# Patient Record
Sex: MALE | Race: WHITE | URBAN - METROPOLITAN AREA
[De-identification: names, ages, dates, MRNs, and addresses within clinical notes are randomized per-mention and may not be internally consistent; named-entity substitution may affect disease eponyms.]

---

## 2021-06-04 ENCOUNTER — TELEPHONE (OUTPATIENT)
Dept: OTHER | Age: 36
End: 2021-06-04

## 2021-06-04 NOTE — TELEPHONE ENCOUNTER
RN=please call patient on Monday 6/7/21 to re assess his diarrhea,see my note below.            Reason for Call/Symptoms: diarrhea  Onset: x 1 day   Courtesy Assessment: stomach cramps before the diarrhea ,episode 5 times yesterday and more than 5 times tod

## 2021-06-07 NOTE — TELEPHONE ENCOUNTER
Patient states his diarrhea has cleared up and that it feels like his anal fissure \"is better,\" and not sure if he should keep his appointment for tomorrow with Dr. Mike Lester. Advised to keep appointment to make sure his fissure is indeed healing.

## 2021-06-08 ENCOUNTER — TELEPHONE (OUTPATIENT)
Dept: FAMILY MEDICINE CLINIC | Facility: CLINIC | Age: 36
End: 2021-06-08

## 2021-06-08 NOTE — TELEPHONE ENCOUNTER
Spoke with pt, pain and symptoms are improving. No more rectal bleeding . Advised hot baths and gentle wiping.  To come see me only if needed in future

## 2021-07-14 ENCOUNTER — PATIENT MESSAGE (OUTPATIENT)
Dept: FAMILY MEDICINE CLINIC | Facility: CLINIC | Age: 36
End: 2021-07-14

## 2021-07-14 ENCOUNTER — OFFICE VISIT (OUTPATIENT)
Dept: FAMILY MEDICINE CLINIC | Facility: CLINIC | Age: 36
End: 2021-07-14
Payer: COMMERCIAL

## 2021-07-14 VITALS
BODY MASS INDEX: 20.59 KG/M2 | SYSTOLIC BLOOD PRESSURE: 122 MMHG | DIASTOLIC BLOOD PRESSURE: 77 MMHG | HEART RATE: 60 BPM | HEIGHT: 71.65 IN | RESPIRATION RATE: 16 BRPM | TEMPERATURE: 97 F | WEIGHT: 150.38 LBS

## 2021-07-14 DIAGNOSIS — K60.2 ANAL FISSURE: ICD-10-CM

## 2021-07-14 DIAGNOSIS — Z00.00 ROUTINE PHYSICAL EXAMINATION: Primary | ICD-10-CM

## 2021-07-14 PROCEDURE — 3078F DIAST BP <80 MM HG: CPT | Performed by: FAMILY MEDICINE

## 2021-07-14 PROCEDURE — 99385 PREV VISIT NEW AGE 18-39: CPT | Performed by: FAMILY MEDICINE

## 2021-07-14 PROCEDURE — 3008F BODY MASS INDEX DOCD: CPT | Performed by: FAMILY MEDICINE

## 2021-07-14 PROCEDURE — 3074F SYST BP LT 130 MM HG: CPT | Performed by: FAMILY MEDICINE

## 2021-07-14 RX ORDER — DOCUSATE SODIUM 100 MG/1
100 CAPSULE, LIQUID FILLED ORAL 2 TIMES DAILY
COMMUNITY

## 2021-07-14 RX ORDER — GARLIC EXTRACT 500 MG
1 CAPSULE ORAL DAILY
COMMUNITY

## 2021-07-14 RX ORDER — HYDROCORTISONE 25 MG/G
1 CREAM TOPICAL 2 TIMES DAILY
Qty: 28 G | Refills: 0 | Status: SHIPPED | OUTPATIENT
Start: 2021-07-14 | End: 2021-07-28

## 2021-07-14 RX ORDER — MULTIVIT-MIN/IRON/FOLIC ACID/K 18-600-40
CAPSULE ORAL
COMMUNITY

## 2021-07-14 NOTE — PROGRESS NOTES
HPI:  31 yr old male who presents for physical. Just moved from [place]. . No children. Works as a . Works at home. Exercises 4 days per week. Healthy diet. Had kidney stone in 2016. Passed on it's own.      Had colonoscopy in 2007 clear.  Gary TMs intact with good landmarks noted. Neck supple. Good ROM. No LAD.   Thyroid normal.  CV:  Regular rate and rhythm; no murmurs  Lungs:  Clear to ausculation; good aeration               No wheezes, rales or rhonchi  Abd: soft, non-tender, n

## 2021-08-03 ENCOUNTER — TELEPHONE (OUTPATIENT)
Dept: FAMILY MEDICINE CLINIC | Facility: CLINIC | Age: 36
End: 2021-08-03

## 2021-08-04 ENCOUNTER — OFFICE VISIT (OUTPATIENT)
Dept: SURGERY | Facility: CLINIC | Age: 36
End: 2021-08-04
Payer: COMMERCIAL

## 2021-08-04 ENCOUNTER — NURSE ONLY (OUTPATIENT)
Dept: FAMILY MEDICINE CLINIC | Facility: CLINIC | Age: 36
End: 2021-08-04
Payer: COMMERCIAL

## 2021-08-04 VITALS
DIASTOLIC BLOOD PRESSURE: 78 MMHG | WEIGHT: 153 LBS | SYSTOLIC BLOOD PRESSURE: 118 MMHG | HEIGHT: 71 IN | BODY MASS INDEX: 21.42 KG/M2

## 2021-08-04 DIAGNOSIS — Z23 NEED FOR VACCINATION: Primary | ICD-10-CM

## 2021-08-04 DIAGNOSIS — K60.1 CHRONIC ANAL FISSURE: Primary | ICD-10-CM

## 2021-08-04 DIAGNOSIS — K92.1 HEMATOCHEZIA: ICD-10-CM

## 2021-08-04 PROCEDURE — 90471 IMMUNIZATION ADMIN: CPT | Performed by: FAMILY MEDICINE

## 2021-08-04 PROCEDURE — 3008F BODY MASS INDEX DOCD: CPT | Performed by: SURGERY

## 2021-08-04 PROCEDURE — 3074F SYST BP LT 130 MM HG: CPT | Performed by: SURGERY

## 2021-08-04 PROCEDURE — 99204 OFFICE O/P NEW MOD 45 MIN: CPT | Performed by: SURGERY

## 2021-08-04 PROCEDURE — 3078F DIAST BP <80 MM HG: CPT | Performed by: SURGERY

## 2021-08-04 PROCEDURE — 90715 TDAP VACCINE 7 YRS/> IM: CPT | Performed by: FAMILY MEDICINE

## 2021-08-04 PROCEDURE — 46600 DIAGNOSTIC ANOSCOPY SPX: CPT | Performed by: SURGERY

## 2021-08-04 NOTE — TELEPHONE ENCOUNTER
Spoke with the patient and he scheduled an appt for today at 4:00 with the nurse for his Tdap vaccine.

## 2021-08-04 NOTE — H&P
History and Physical      HPI   Patient presents with:  Referral: Referral from Dr. Yusuf Patiño possible anal fissure. Onset about 4 months. Patient reports 1 to 2 BM's mostly every morning. Patient notices blood after wiping.   Patient had colonoscopy in tobacco: Never Used    Substance and Sexual Activity      Alcohol use:  Yes        Alcohol/week: 4.0 standard drinks        Types: 4 Glasses of wine per week      Drug use: Yes        Types: Cannabis        Comment: rarely    Family History   Problem Relati of lifestyle and IBS. Advised pt that he can follow up in office if he develops new/concerning symptoms. All of the patient's questions have been answered to his satisfaction.        8/4/2021  Alex Luke MD

## 2021-08-04 NOTE — PROGRESS NOTES
Patient is here today for his Tdap injection. Patient verified date of birth as well as first and last name. Patient tolerated vaccine well with no adverse reaction noted at this time.

## 2021-08-04 NOTE — PATIENT INSTRUCTIONS
Start an U-Jonathan suppository twice a day.     Follow-up with Isabella Gregorio from gastroenterology

## 2021-08-27 ENCOUNTER — TELEPHONE (OUTPATIENT)
Dept: SURGERY | Facility: CLINIC | Age: 36
End: 2021-08-27

## 2021-08-27 NOTE — TELEPHONE ENCOUNTER
Per pt after a month he continues to have pain and bleeding, requesting to speak to RN. Also  pt called pharmacy and suppository prescription has not been received. Please advise thank you.

## 2021-09-21 ENCOUNTER — TELEPHONE (OUTPATIENT)
Dept: GASTROENTEROLOGY | Facility: CLINIC | Age: 36
End: 2021-09-21

## 2021-09-21 NOTE — TELEPHONE ENCOUNTER
TERESE and Tressa message sent to pt confirming appt is at 230pm not 3pm (notes in message state coming at 3pm)

## 2021-09-21 NOTE — H&P
[facility] - Gastroenterology                                                                                                               Reason for consult: ibs    Requesting physician or provider: ROMA Stoner kg)  07/14/21 : 150 lb 6.4 oz (68.2 kg)       History, Medications, Allergies, ROS:      Past Medical History:   Diagnosis Date   • Anal fissure       History reviewed. No pertinent surgical history.    Family Hx:   Family History   Problem Relation Age of 3.2 oz (70.9 kg).     GEN: WD/WN, NAD  HEENT: Supple symmetrical, trachea midline  CV: RRR, the extremities are warm and well perfused   LUNGS: No increased work of breathing  ABDOMEN: No scars, normal bowel sounds, soft, non-tender, non-distended no reboun substitute Trilyte (or any similar generic prep). This can be done by notifying the pharmacy or calling our office.       Orders This Visit:  Orders Placed This Encounter      CBC W Differential W Platelet      Comp Metabolic Panel (14)      TSH W Reflex To

## 2021-09-23 ENCOUNTER — OFFICE VISIT (OUTPATIENT)
Dept: GASTROENTEROLOGY | Facility: CLINIC | Age: 36
End: 2021-09-23
Payer: COMMERCIAL

## 2021-09-23 ENCOUNTER — PATIENT MESSAGE (OUTPATIENT)
Dept: GASTROENTEROLOGY | Facility: CLINIC | Age: 36
End: 2021-09-23

## 2021-09-23 ENCOUNTER — TELEPHONE (OUTPATIENT)
Dept: GASTROENTEROLOGY | Facility: CLINIC | Age: 36
End: 2021-09-23

## 2021-09-23 ENCOUNTER — LAB ENCOUNTER (OUTPATIENT)
Dept: LAB | Age: 36
End: 2021-09-23
Attending: FAMILY MEDICINE
Payer: COMMERCIAL

## 2021-09-23 VITALS
BODY MASS INDEX: 21.87 KG/M2 | WEIGHT: 156.19 LBS | DIASTOLIC BLOOD PRESSURE: 69 MMHG | TEMPERATURE: 99 F | SYSTOLIC BLOOD PRESSURE: 115 MMHG | HEART RATE: 61 BPM | HEIGHT: 71 IN

## 2021-09-23 DIAGNOSIS — K58.9 IRRITABLE BOWEL SYNDROME WITHOUT DIARRHEA: ICD-10-CM

## 2021-09-23 DIAGNOSIS — K60.2 ANAL FISSURE: Primary | ICD-10-CM

## 2021-09-23 DIAGNOSIS — K60.2 ANAL FISSURE: ICD-10-CM

## 2021-09-23 DIAGNOSIS — K58.9 IRRITABLE BOWEL SYNDROME, UNSPECIFIED TYPE: ICD-10-CM

## 2021-09-23 DIAGNOSIS — K62.5 BRBPR (BRIGHT RED BLOOD PER RECTUM): ICD-10-CM

## 2021-09-23 DIAGNOSIS — K62.5 BRIGHT RED BLOOD PER RECTUM: ICD-10-CM

## 2021-09-23 LAB
ALBUMIN SERPL-MCNC: 3.9 G/DL (ref 3.4–5)
ALBUMIN/GLOB SERPL: 1 {RATIO} (ref 1–2)
ALP LIVER SERPL-CCNC: 85 U/L
ALT SERPL-CCNC: 24 U/L
ANION GAP SERPL CALC-SCNC: 5 MMOL/L (ref 0–18)
AST SERPL-CCNC: 15 U/L (ref 15–37)
BASOPHILS # BLD AUTO: 0.06 X10(3) UL (ref 0–0.2)
BASOPHILS NFR BLD AUTO: 0.7 %
BILIRUB SERPL-MCNC: 0.3 MG/DL (ref 0.1–2)
BUN BLD-MCNC: 20 MG/DL (ref 7–18)
BUN/CREAT SERPL: 27 (ref 10–20)
CALCIUM BLD-MCNC: 8.7 MG/DL (ref 8.5–10.1)
CHLORIDE SERPL-SCNC: 103 MMOL/L (ref 98–112)
CO2 SERPL-SCNC: 31 MMOL/L (ref 21–32)
CREAT BLD-MCNC: 0.74 MG/DL
CRP SERPL-MCNC: <0.29 MG/DL (ref ?–0.3)
DEPRECATED RDW RBC AUTO: 43.4 FL (ref 35.1–46.3)
EOSINOPHIL # BLD AUTO: 0.27 X10(3) UL (ref 0–0.7)
EOSINOPHIL NFR BLD AUTO: 3.2 %
ERYTHROCYTE [DISTWIDTH] IN BLOOD BY AUTOMATED COUNT: 12.4 % (ref 11–15)
ERYTHROCYTE [SEDIMENTATION RATE] IN BLOOD: 10 MM/HR
GLOBULIN PLAS-MCNC: 3.9 G/DL (ref 2.8–4.4)
GLUCOSE BLD-MCNC: 91 MG/DL (ref 70–99)
HCT VFR BLD AUTO: 47.7 %
HGB BLD-MCNC: 15.7 G/DL
IMM GRANULOCYTES # BLD AUTO: 0.03 X10(3) UL (ref 0–1)
IMM GRANULOCYTES NFR BLD: 0.4 %
LYMPHOCYTES # BLD AUTO: 2.21 X10(3) UL (ref 1–4)
LYMPHOCYTES NFR BLD AUTO: 26.3 %
MCH RBC QN AUTO: 30.8 PG (ref 26–34)
MCHC RBC AUTO-ENTMCNC: 32.9 G/DL (ref 31–37)
MCV RBC AUTO: 93.7 FL
MONOCYTES # BLD AUTO: 0.79 X10(3) UL (ref 0.1–1)
MONOCYTES NFR BLD AUTO: 9.4 %
NEUTROPHILS # BLD AUTO: 5.05 X10 (3) UL (ref 1.5–7.7)
NEUTROPHILS # BLD AUTO: 5.05 X10(3) UL (ref 1.5–7.7)
NEUTROPHILS NFR BLD AUTO: 60 %
OSMOLALITY SERPL CALC.SUM OF ELEC: 290 MOSM/KG (ref 275–295)
PATIENT FASTING Y/N/NP: NO
PLATELET # BLD AUTO: 289 10(3)UL (ref 150–450)
POTASSIUM SERPL-SCNC: 4.5 MMOL/L (ref 3.5–5.1)
PROT SERPL-MCNC: 7.8 G/DL (ref 6.4–8.2)
RBC # BLD AUTO: 5.09 X10(6)UL
SODIUM SERPL-SCNC: 139 MMOL/L (ref 136–145)
TSI SER-ACNC: 1.14 MIU/ML (ref 0.36–3.74)
VIT B12 SERPL-MCNC: 613 PG/ML (ref 193–986)
VIT D+METAB SERPL-MCNC: 47.7 NG/ML (ref 30–100)
WBC # BLD AUTO: 8.4 X10(3) UL (ref 4–11)

## 2021-09-23 PROCEDURE — 85025 COMPLETE CBC W/AUTO DIFF WBC: CPT

## 2021-09-23 PROCEDURE — 85652 RBC SED RATE AUTOMATED: CPT

## 2021-09-23 PROCEDURE — 82607 VITAMIN B-12: CPT

## 2021-09-23 PROCEDURE — 84443 ASSAY THYROID STIM HORMONE: CPT

## 2021-09-23 PROCEDURE — 82306 VITAMIN D 25 HYDROXY: CPT | Performed by: NURSE PRACTITIONER

## 2021-09-23 PROCEDURE — 3074F SYST BP LT 130 MM HG: CPT | Performed by: NURSE PRACTITIONER

## 2021-09-23 PROCEDURE — 3008F BODY MASS INDEX DOCD: CPT | Performed by: NURSE PRACTITIONER

## 2021-09-23 PROCEDURE — 99244 OFF/OP CNSLTJ NEW/EST MOD 40: CPT | Performed by: NURSE PRACTITIONER

## 2021-09-23 PROCEDURE — 36415 COLL VENOUS BLD VENIPUNCTURE: CPT

## 2021-09-23 PROCEDURE — 86140 C-REACTIVE PROTEIN: CPT

## 2021-09-23 PROCEDURE — 80053 COMPREHEN METABOLIC PANEL: CPT

## 2021-09-23 PROCEDURE — 3078F DIAST BP <80 MM HG: CPT | Performed by: NURSE PRACTITIONER

## 2021-09-23 NOTE — TELEPHONE ENCOUNTER
From: Ana Mauro  To: [facility].  Nivia Barthel  Sent: 9/23/2021 4:13 PM CDT  Subject: Probiotics    Hello,    What are the names of the probiotic supplements you recommend?    -Jose Ruff

## 2021-09-23 NOTE — TELEPHONE ENCOUNTER
Scheduled for:  Colonoscopy 86064  Provider Name:  Dr Makayla Masters  Date:  11/15/21  Location:  [facility]  Sedation:  [facility]  Time:  1:15pm (pt is aware that [facility] will call the day before to confirm arrival time)   Prep:  Trilyte  Meds/Allergies Reconciled?:  Adele/MOJGAN

## 2021-09-23 NOTE — PATIENT INSTRUCTIONS
-ibgard  -citrucel  -labs  1. Schedule colonoscopy with [facility] w/ Dr. Royal Olguin [Diagnosis: anal fissure, brbpr, ibs]    2.  bowel prep from pharmacy (split trilyte)    3. Continue all medications for procedure    4.  Read all bowel prep instructions care

## 2021-10-04 RX ORDER — POLYETHYLENE GLYCOL 3350, SODIUM CHLORIDE, SODIUM BICARBONATE, POTASSIUM CHLORIDE 420; 11.2; 5.72; 1.48 G/4L; G/4L; G/4L; G/4L
4000 POWDER, FOR SOLUTION ORAL ONCE
Qty: 1 EACH | Refills: 0 | Status: SHIPPED | OUTPATIENT
Start: 2021-10-04 | End: 2021-10-04

## 2021-10-04 NOTE — TELEPHONE ENCOUNTER
Pt called in to get refill on medication for the upcoming procedure, he is unsure which medication is being prescribed to him.  Please follow up

## 2021-11-02 ENCOUNTER — TELEPHONE (OUTPATIENT)
Dept: GASTROENTEROLOGY | Facility: CLINIC | Age: 36
End: 2021-11-02

## 2021-11-03 NOTE — TELEPHONE ENCOUNTER
Cancelled for:  Colonoscopy 66660  Provider Name:  Dr Vicky Espinal  Date:  11/15/21  Location:  [facility]  Sedation:  [facility]  Time:  4441    Prep:  Yemi Yanez  Meds/Allergies Reconciled?:  Adele/NP reviewed.    Diagnosis with codes:  Anal Fissure K60.2 Bright red blood pe

## 2024-08-14 ENCOUNTER — OFFICE VISIT (OUTPATIENT)
Dept: FAMILY MEDICINE CLINIC | Facility: CLINIC | Age: 39
End: 2024-08-14
Payer: COMMERCIAL

## 2024-08-14 ENCOUNTER — LAB ENCOUNTER (OUTPATIENT)
Dept: LAB | Age: 39
End: 2024-08-14
Payer: COMMERCIAL

## 2024-08-14 ENCOUNTER — PATIENT MESSAGE (OUTPATIENT)
Dept: FAMILY MEDICINE CLINIC | Facility: CLINIC | Age: 39
End: 2024-08-14

## 2024-08-14 VITALS
HEART RATE: 84 BPM | SYSTOLIC BLOOD PRESSURE: 124 MMHG | BODY MASS INDEX: 19.43 KG/M2 | HEIGHT: 72.5 IN | OXYGEN SATURATION: 97 % | DIASTOLIC BLOOD PRESSURE: 70 MMHG | WEIGHT: 145 LBS | TEMPERATURE: 97 F

## 2024-08-14 DIAGNOSIS — E55.9 VITAMIN D DEFICIENCY: ICD-10-CM

## 2024-08-14 DIAGNOSIS — K52.9 CHRONIC DIARRHEA: ICD-10-CM

## 2024-08-14 DIAGNOSIS — M54.50 CHRONIC BILATERAL LOW BACK PAIN WITHOUT SCIATICA: ICD-10-CM

## 2024-08-14 DIAGNOSIS — R19.7 DIARRHEA, UNSPECIFIED TYPE: Primary | ICD-10-CM

## 2024-08-14 DIAGNOSIS — Z00.00 ENCOUNTER FOR ANNUAL PHYSICAL EXAM: ICD-10-CM

## 2024-08-14 DIAGNOSIS — G89.29 CHRONIC BILATERAL LOW BACK PAIN WITHOUT SCIATICA: ICD-10-CM

## 2024-08-14 DIAGNOSIS — Z00.00 ENCOUNTER FOR ANNUAL PHYSICAL EXAM: Primary | ICD-10-CM

## 2024-08-14 LAB
ALBUMIN SERPL-MCNC: 4.3 G/DL (ref 3.2–4.8)
ALBUMIN/GLOB SERPL: 1.5 {RATIO} (ref 1–2)
ALP LIVER SERPL-CCNC: 63 U/L
ALT SERPL-CCNC: 12 U/L
ANION GAP SERPL CALC-SCNC: 5 MMOL/L (ref 0–18)
AST SERPL-CCNC: 16 U/L (ref ?–34)
BILIRUB SERPL-MCNC: 1 MG/DL (ref 0.3–1.2)
BUN BLD-MCNC: 9 MG/DL (ref 9–23)
BUN/CREAT SERPL: 10.1 (ref 10–20)
CALCIUM BLD-MCNC: 9.7 MG/DL (ref 8.7–10.4)
CHLORIDE SERPL-SCNC: 104 MMOL/L (ref 98–112)
CHOLEST SERPL-MCNC: 166 MG/DL (ref ?–200)
CO2 SERPL-SCNC: 32 MMOL/L (ref 21–32)
CREAT BLD-MCNC: 0.89 MG/DL
EGFRCR SERPLBLD CKD-EPI 2021: 113 ML/MIN/1.73M2 (ref 60–?)
FASTING PATIENT LIPID ANSWER: NO
FASTING STATUS PATIENT QL REPORTED: NO
GLOBULIN PLAS-MCNC: 2.8 G/DL (ref 2–3.5)
GLUCOSE BLD-MCNC: 91 MG/DL (ref 70–99)
HDLC SERPL-MCNC: 65 MG/DL (ref 40–59)
LDLC SERPL CALC-MCNC: 87 MG/DL (ref ?–100)
NONHDLC SERPL-MCNC: 101 MG/DL (ref ?–130)
OSMOLALITY SERPL CALC.SUM OF ELEC: 290 MOSM/KG (ref 275–295)
POTASSIUM SERPL-SCNC: 4.5 MMOL/L (ref 3.5–5.1)
PROT SERPL-MCNC: 7.1 G/DL (ref 5.7–8.2)
SODIUM SERPL-SCNC: 141 MMOL/L (ref 136–145)
TRIGL SERPL-MCNC: 75 MG/DL (ref 30–149)
TSI SER-ACNC: 1.88 MIU/ML (ref 0.55–4.78)
VIT D+METAB SERPL-MCNC: 29.8 NG/ML (ref 30–100)
VLDLC SERPL CALC-MCNC: 12 MG/DL (ref 0–30)

## 2024-08-14 PROCEDURE — 3074F SYST BP LT 130 MM HG: CPT

## 2024-08-14 PROCEDURE — 80061 LIPID PANEL: CPT

## 2024-08-14 PROCEDURE — 82306 VITAMIN D 25 HYDROXY: CPT

## 2024-08-14 PROCEDURE — 3078F DIAST BP <80 MM HG: CPT

## 2024-08-14 PROCEDURE — 99395 PREV VISIT EST AGE 18-39: CPT

## 2024-08-14 PROCEDURE — 80053 COMPREHEN METABOLIC PANEL: CPT

## 2024-08-14 PROCEDURE — 84443 ASSAY THYROID STIM HORMONE: CPT

## 2024-08-14 PROCEDURE — 36415 COLL VENOUS BLD VENIPUNCTURE: CPT

## 2024-08-14 PROCEDURE — 3008F BODY MASS INDEX DOCD: CPT

## 2024-08-14 RX ORDER — ERGOCALCIFEROL 1.25 MG/1
50000 CAPSULE ORAL WEEKLY
Qty: 12 CAPSULE | Refills: 0 | Status: SHIPPED | OUTPATIENT
Start: 2024-08-14

## 2024-08-14 RX ORDER — CHOLECALCIFEROL (VITAMIN D3) 10(400)/ML
DROPS ORAL
COMMUNITY
Start: 2023-08-07

## 2024-08-14 NOTE — PROGRESS NOTES
Devon Sandoval is a 39 year old male.No chief complaint on file.    HPI:   Devon Sandoval presented to the clinic for annual physical examination. No acute concerns. No changes in family/personal history. Normal Sleep. Normal appetite. Balanced diet. Normal BM/Urination. Physically active, swims.  HX of chronic diarrhea, has seen GI in the past.   Occupation: writer    Current Outpatient Medications   Medication Sig Dispense Refill    Loperamide HCl (IMODIUM A-D OR)       Digestive Enzyme Oral Cap       METHYLCELLULOSE, LAXATIVE, OR Take by mouth.      Cholecalciferol (VITAMIN D) 50 MCG (2000 UT) Oral Cap Take by mouth.      propranolol 10 MG Oral Tab Take 1 tablet (10 mg total) by mouth as needed. 30 tablet 1    Psyllium (METAMUCIL FIBER OR)       Hydrocortisone (ANUCORT-HC) 25 MG Rectal Suppos Place 1 suppository (25 mg total) rectally 2 (two) times daily. 20 suppository 1    Acidophilus/Pectin Oral Cap Take 1 capsule by mouth daily. (Patient not taking: Reported on 8/14/2024)        Past Medical History:    Anal fissure      No past surgical history on file.   Social History:  Social History     Socioeconomic History    Marital status:    Tobacco Use    Smoking status: Never    Smokeless tobacco: Never   Substance and Sexual Activity    Alcohol use: Yes     Alcohol/week: 4.0 standard drinks of alcohol     Types: 4 Glasses of wine per week    Drug use: Yes     Types: Cannabis     Comment: rarely      Family History   Problem Relation Age of Onset    Cancer Father         bladder    Stroke Mother         TIA    Cancer Paternal Grandmother         lung- smoker      Allergies   Allergen Reactions    Sulfa Antibiotics HIVES        REVIEW OF SYSTEMS:   Review of Systems   Constitutional:  Negative for activity change.   HENT: Negative.     Eyes: Negative.    Respiratory:  Negative for chest tightness and shortness of breath.    Cardiovascular:  Negative for chest pain and palpitations.   Gastrointestinal:  Negative  for abdominal pain, constipation and diarrhea.   Genitourinary: Negative.  Negative for difficulty urinating.   Musculoskeletal:  Positive for back pain (intermittent).   Skin: Negative.    Neurological: Negative.  Negative for dizziness and headaches.   Psychiatric/Behavioral: Negative.     All other systems reviewed and are negative.     Wt Readings from Last 5 Encounters:   08/14/24 145 lb (65.8 kg)   09/23/21 156 lb 3.2 oz (70.9 kg)   08/04/21 153 lb (69.4 kg)   07/14/21 150 lb 6.4 oz (68.2 kg)     Body mass index is 19.4 kg/m².      EXAM:   /70 (BP Location: Right arm, Patient Position: Sitting, Cuff Size: adult)   Pulse 84   Temp 97.4 °F (36.3 °C) (Temporal)   Ht 6' 0.5\" (1.842 m)   Wt 145 lb (65.8 kg)   SpO2 97%   BMI 19.40 kg/m²   Physical Exam  Vitals and nursing note reviewed.   Constitutional:       Appearance: Normal appearance.   HENT:      Head: Normocephalic and atraumatic.      Right Ear: Tympanic membrane and external ear normal.      Left Ear: Tympanic membrane and external ear normal.      Mouth/Throat:      Mouth: Mucous membranes are moist.      Pharynx: Oropharynx is clear.   Eyes:      Conjunctiva/sclera: Conjunctivae normal.      Pupils: Pupils are equal, round, and reactive to light.   Cardiovascular:      Rate and Rhythm: Normal rate and regular rhythm.      Pulses: Normal pulses.      Heart sounds: Normal heart sounds.   Pulmonary:      Effort: Pulmonary effort is normal.      Breath sounds: No wheezing.   Abdominal:      General: Abdomen is flat. There is no distension.      Palpations: Abdomen is soft. There is no mass.      Tenderness: There is no abdominal tenderness. There is no guarding.      Hernia: No hernia is present.   Musculoskeletal:         General: Normal range of motion.      Cervical back: Normal and normal range of motion.      Thoracic back: Normal.      Lumbar back: Normal. No spasms or tenderness. Normal range of motion. Negative right straight leg raise  test and negative left straight leg raise test.   Skin:     General: Skin is warm.   Neurological:      General: No focal deficit present.      Mental Status: He is alert and oriented to person, place, and time.   Psychiatric:         Mood and Affect: Mood normal.         Behavior: Behavior normal.            ASSESSMENT AND PLAN:   (Z00.00) Encounter for annual physical exam  (primary encounter diagnosis)  Plan: Lipid Panel [E], TSH W Reflex To Free T4 [E],         Comp Metabolic Panel (14) [E]        - Immunizations UTD   - tobacco, alcohol, illicit drug use discouraged  - safe sexual practices advised  - Reinforced healthy diet, lifestyle, and exercise.  - Past Medical/Social/Family histories reviewed  - Regular dental visits recommended   - Regular eye exams recommended     No recommendations at this time  No recommendations at this time   No recommendations at this time   No recommendations at this time      Follow up in 1 year or sooner if needed      (E55.9) Vitamin D deficiency  Plan: Vitamin D [E]        Labs completed today.  Further management pending results.    (K52.9) Chronic diarrhea  Plan: Patient taking Imodium for chronic diarrhea.  Has followed up with GI in the past-IBS.  Labs completed today.  Further management pending results.    (M54.50,  G89.29) Chronic bilateral low back pain without sciatica  Plan: Patient with chronic intermittent low back pain.  Denies radiation to the lower extremities.  Denies decreased range of motion.  Unable to provoke pain today.  Negative exam.  Has completed PT with success.  Advised to follow-up if symptoms return or worsen.  Consider imaging at that time.    Follow-up as needed.      The patient indicates understanding of these issues and agrees to the plan.  Chaperone offered to the patient prior to examination    This note was prepared using Dragon Medical voice recognition dictation software. As a result errors may occur. When identified these errors have  been corrected. While every attempt is made to correct errors during dictation discrepancies may still exist.

## 2024-08-15 NOTE — TELEPHONE ENCOUNTER
From: Devon Sandoval  To: Natasha Zarate  Sent: 8/14/2024 5:04 PM CDT  Subject: Prescription    You mentioned sending a prescription to my pharmacy. Is that just for Vitamin D, or did you also approve of me taking Lomotil based on my blood work?

## 2024-08-19 RX ORDER — DIPHENOXYLATE HYDROCHLORIDE AND ATROPINE SULFATE 2.5; .025 MG/1; MG/1
1 TABLET ORAL 2 TIMES DAILY PRN
Qty: 30 TABLET | Refills: 0 | Status: SHIPPED | OUTPATIENT
Start: 2024-08-19

## 2024-08-19 NOTE — TELEPHONE ENCOUNTER
Short term only script only. Will need to follow up with GI to discuss long term if there is improvement in chronic diarrhea. Referral placed.

## 2024-11-13 ENCOUNTER — OFFICE VISIT (OUTPATIENT)
Dept: GASTROENTEROLOGY | Facility: CLINIC | Age: 39
End: 2024-11-13
Payer: COMMERCIAL

## 2024-11-13 VITALS
HEIGHT: 77 IN | SYSTOLIC BLOOD PRESSURE: 113 MMHG | WEIGHT: 148 LBS | BODY MASS INDEX: 17.47 KG/M2 | HEART RATE: 55 BPM | DIASTOLIC BLOOD PRESSURE: 67 MMHG

## 2024-11-13 DIAGNOSIS — K62.5 BRBPR (BRIGHT RED BLOOD PER RECTUM): ICD-10-CM

## 2024-11-13 DIAGNOSIS — R14.0 GASSINESS: Primary | ICD-10-CM

## 2024-11-13 PROCEDURE — 99213 OFFICE O/P EST LOW 20 MIN: CPT | Performed by: NURSE PRACTITIONER

## 2024-11-13 RX ORDER — TURMERIC 400 MG
CAPSULE ORAL
COMMUNITY
Start: 2024-10-01

## 2024-11-13 NOTE — PROGRESS NOTES
[facility] - Gastroenterology                                                                                                               Reason for consult: eval    Requesting physician or provider: Colleen Weiler, DO    Chief Complaint   Patient presents with    Bloating       HPI:   Devon Sandoval is a 39 year old year-old male with history of anal fissure:     He is here today for f/U    Last seen 2021  ibs  Sx since 2014    H/o bloody diarrhea 2006 and had cln at that time in [place].  Reportedly normal  Saw gastro in [place] 2017 for same symptoms-went once and was given fodmap diet  Tried it and didn't help  Didn't f/U    H/o 4 anal fissures and seeing Dr. Guevara  At last visit no fissure on exam and started on steroid suppository     Reports trying imodium, but had side effects and made fissures worse so stopped.      Has used probiotic, citrucel/metamucil  Currently using citrucel.  He reports having excessive flatulence.    No bloating    Is following FODMAP diet    he moves his bowels daily. He denies diarrhea as long as he avoid triggers.   he denies brbpr and/or melena.    he denies acid reflux and/or heartburn. he denies dysphagia, odynophagia and/or globus. he denies abdominal pain. he denies nausea and/or vomiting.  he denies recent change in appetite and/or unintentional weight loss.    NSAIDS: no  Tobacco: no  Alcohol: 2-3 drinks/week  Illicit drugs: marijuana rare     No FH GI malignancy, ibd, celiac    No history of adverse reaction to sedation  No MARTIR  No anticoagulants  No pacemaker/defibrillator  No pain medications and/or sleep aides      Last colonoscopy: 2007 in [place] and he reports was normal   Last EGD: no    Wt Readings from Last 6 Encounters:   11/13/24 148 lb (67.1 kg)   08/14/24 145 lb (65.8 kg)   09/23/21 156 lb 3.2 oz (70.9 kg)   08/04/21 153 lb (69.4 kg)   07/14/21 150 lb 6.4 oz (68.2 kg)        History, Medications, Allergies, ROS:      Past  Medical History:    Anal fissure      Past Surgical History:   Procedure Laterality Date    Colonoscopy  2007    [place]      Family Hx:   Family History   Problem Relation Age of Onset    Cancer Father         bladder    Stroke Mother         TIA    Cancer Paternal Grandmother         lung- smoker      Social History:   Social History     Socioeconomic History    Marital status:    Tobacco Use    Smoking status: Never    Smokeless tobacco: Never   Substance and Sexual Activity    Alcohol use: Yes     Alcohol/week: 4.0 standard drinks of alcohol     Types: 4 Glasses of wine per week    Drug use: Yes     Types: Cannabis     Comment: rarely        Medications (Active prior to today's visit):  Current Outpatient Medications   Medication Sig Dispense Refill    Turmeric 400 MG Oral Cap       LACTOBACILLUS BIFIDUS OR       rifAXIMin 550 MG Oral Tab Take 1 tablet (550 mg total) by mouth 3 (three) times daily for 14 days. 42 tablet 0    Digestive Enzyme Oral Cap       METHYLCELLULOSE, LAXATIVE, OR Take by mouth.      Cholecalciferol (VITAMIN D) 50 MCG (2000 UT) Oral Cap Take by mouth.      diphenoxylate-atropine 2.5-0.025 MG Oral Tab Take 1 tablet by mouth 2 (two) times daily as needed for Diarrhea. 30 tablet 0    ergocalciferol 1.25 MG (11826 UT) Oral Cap Take 1 capsule (50,000 Units total) by mouth once a week. 12 capsule 0       Allergies:  Allergies[1]    ROS:   CONSTITUTIONAL: negative for fevers, chills, sweats and weight loss  EYES Negative for red eyes, yellow eyes, changes in vision  HEENT: Negative for dysphagia and hoarseness  RESPIRATORY: Negative for cough and shortness of breath  CARDIOVASCULAR: Negative for chest pain, palpitations  GASTROINTESTINAL: See HPI  GENITOURINARY: Negative for dysuria and frequency  MUSCULOSKELETAL: Negative for arthralgias and myalgias  NEUROLOGICAL: Negative for dizziness and headaches  BEHAVIOR/PSYCH: Negative for anxiety and poor appetite    PHYSICAL EXAM:   Blood  pressure 113/67, pulse 55, height 6' 5\" (1.956 m), weight 148 lb (67.1 kg).    GEN: WD/WN, NAD  HEENT: Supple symmetrical, trachea midline  CV: RRR, the extremities are warm and well perfused   LUNGS: No increased work of breathing  ABDOMEN: No scars, normal bowel sounds, soft, non-tender, non-distended no rebound or guarding, no masses, no hepatomegaly  MSK: No redness, no warmth, no swelling of joints  SKIN: No jaundice, no erythema, no rashes  HEMATOLOGIC: No bleeding, no bruising  NEURO: Alert and interactive, normal gait    Labs/Imaging/Procedures:     Patient's pertinent labs and imaging were reviewed and discussed with patient today.        .  ASSESSMENT/PLAN:   Devon Sandoval is a 39 year old year-old male with history of anal fissure:     #brbpr  Did not schedule c-scope after last visit. Diarrhea, brbpr resolved. Not anemic on recent cbc. Recommend CTM for need for c-scope.    #gassiness  He says symptoms controlled with Fodmap diet.  He denies brbpr, melena. N/v, abd pain.  He describes on-going gassiness with trigger foods. We discussed considering testing for sibo vai breath test vs empiric treatment.  He is electing empiric treatment. Recommendations below.     -Xifaxan  -align following antibiotic treatment  -continue fodmap diet and cycle food back in every 3 mos  -consider seeing Joel Siu at [facility] (functional specialist)      Orders This Visit:  No orders of the defined types were placed in this encounter.      Meds This Visit:  Requested Prescriptions     Signed Prescriptions Disp Refills    rifAXIMin 550 MG Oral Tab 42 tablet 0     Sig: Take 1 tablet (550 mg total) by mouth 3 (three) times daily for 14 days.       Imaging & Referrals:  None      Adele Torres, APRN   11/13/2024        This note was partially prepared using Dragon Medical voice recognition dictation software. As a result, errors may occur. When identified, these errors have been corrected. While every attempt is made to  correct errors during dictation, discrepancies may still exist.          [1]   Allergies  Allergen Reactions    Sulfa Antibiotics HIVES

## 2024-11-13 NOTE — PATIENT INSTRUCTIONS
-Xifaxan  -align  -continue fodmap diet and cycle food back in every 3 mos  -consider seeing Joel Siu at [facility]

## 2024-11-22 ENCOUNTER — TELEPHONE (OUTPATIENT)
Facility: CLINIC | Age: 39
End: 2024-11-22

## 2024-11-22 NOTE — TELEPHONE ENCOUNTER
I spoke to the patient's pharmacy (He switch his pharmacy to [facility])    I called [facility] pharmacy and was told that the patient needed a prior authorization for the rifaximin    I notified the Pharmacy that a prior authorization was completed and was approved    Pharmacy to call the patient and let him know the status of the prescription

## 2025-01-02 ENCOUNTER — PATIENT MESSAGE (OUTPATIENT)
Dept: GASTROENTEROLOGY | Facility: CLINIC | Age: 40
End: 2025-01-02

## 2025-05-13 ENCOUNTER — MED REC SCAN ONLY (OUTPATIENT)
Dept: FAMILY MEDICINE CLINIC | Facility: CLINIC | Age: 40
End: 2025-05-13